# Patient Record
Sex: FEMALE | Race: WHITE | NOT HISPANIC OR LATINO
[De-identification: names, ages, dates, MRNs, and addresses within clinical notes are randomized per-mention and may not be internally consistent; named-entity substitution may affect disease eponyms.]

---

## 2018-02-14 ENCOUNTER — APPOINTMENT (OUTPATIENT)
Dept: MAMMOGRAPHY | Facility: HOSPITAL | Age: 68
End: 2018-02-14

## 2018-02-14 ENCOUNTER — OUTPATIENT (OUTPATIENT)
Dept: OUTPATIENT SERVICES | Facility: HOSPITAL | Age: 68
LOS: 1 days | End: 2018-02-14
Payer: MEDICARE

## 2018-02-14 PROCEDURE — 77067 SCR MAMMO BI INCL CAD: CPT

## 2018-02-14 PROCEDURE — 77067 SCR MAMMO BI INCL CAD: CPT | Mod: 26

## 2020-11-05 ENCOUNTER — OUTPATIENT (OUTPATIENT)
Dept: OUTPATIENT SERVICES | Facility: HOSPITAL | Age: 70
LOS: 1 days | End: 2020-11-05
Payer: MEDICARE

## 2020-11-05 ENCOUNTER — APPOINTMENT (OUTPATIENT)
Dept: MAMMOGRAPHY | Facility: HOSPITAL | Age: 70
End: 2020-11-05
Payer: MEDICARE

## 2020-11-05 PROCEDURE — 77067 SCR MAMMO BI INCL CAD: CPT | Mod: 26

## 2020-11-05 PROCEDURE — 77063 BREAST TOMOSYNTHESIS BI: CPT

## 2020-11-05 PROCEDURE — 77063 BREAST TOMOSYNTHESIS BI: CPT | Mod: 26

## 2020-11-05 PROCEDURE — 77067 SCR MAMMO BI INCL CAD: CPT

## 2020-11-09 ENCOUNTER — APPOINTMENT (OUTPATIENT)
Dept: RADIOLOGY | Facility: HOSPITAL | Age: 70
End: 2020-11-09
Payer: MEDICARE

## 2020-11-09 ENCOUNTER — OUTPATIENT (OUTPATIENT)
Dept: OUTPATIENT SERVICES | Facility: HOSPITAL | Age: 70
LOS: 1 days | End: 2020-11-09
Payer: MEDICARE

## 2020-11-09 PROCEDURE — 77080 DXA BONE DENSITY AXIAL: CPT | Mod: 26

## 2020-11-09 PROCEDURE — 77080 DXA BONE DENSITY AXIAL: CPT

## 2021-11-10 ENCOUNTER — OUTPATIENT (OUTPATIENT)
Dept: OUTPATIENT SERVICES | Facility: HOSPITAL | Age: 71
LOS: 1 days | End: 2021-11-10
Payer: MEDICARE

## 2021-11-10 ENCOUNTER — APPOINTMENT (OUTPATIENT)
Dept: MAMMOGRAPHY | Facility: HOSPITAL | Age: 71
End: 2021-11-10
Payer: MEDICARE

## 2021-11-10 PROCEDURE — 77067 SCR MAMMO BI INCL CAD: CPT

## 2021-11-10 PROCEDURE — 77067 SCR MAMMO BI INCL CAD: CPT | Mod: 26

## 2021-11-10 PROCEDURE — 77063 BREAST TOMOSYNTHESIS BI: CPT | Mod: 26

## 2021-11-10 PROCEDURE — 77063 BREAST TOMOSYNTHESIS BI: CPT

## 2022-11-11 ENCOUNTER — APPOINTMENT (OUTPATIENT)
Dept: MAMMOGRAPHY | Facility: HOSPITAL | Age: 72
End: 2022-11-11

## 2022-11-11 ENCOUNTER — OUTPATIENT (OUTPATIENT)
Dept: OUTPATIENT SERVICES | Facility: HOSPITAL | Age: 72
LOS: 1 days | End: 2022-11-11
Payer: MEDICARE

## 2022-11-11 PROCEDURE — 77067 SCR MAMMO BI INCL CAD: CPT | Mod: 26

## 2022-11-11 PROCEDURE — 77063 BREAST TOMOSYNTHESIS BI: CPT | Mod: 26

## 2022-11-11 PROCEDURE — 77063 BREAST TOMOSYNTHESIS BI: CPT

## 2022-11-11 PROCEDURE — 77067 SCR MAMMO BI INCL CAD: CPT

## 2023-08-14 ENCOUNTER — OUTPATIENT (OUTPATIENT)
Dept: OUTPATIENT SERVICES | Facility: HOSPITAL | Age: 73
LOS: 1 days | End: 2023-08-14
Payer: MEDICARE

## 2023-08-14 ENCOUNTER — APPOINTMENT (OUTPATIENT)
Dept: RADIOLOGY | Facility: HOSPITAL | Age: 73
End: 2023-08-14
Payer: MEDICARE

## 2023-08-14 PROCEDURE — 77080 DXA BONE DENSITY AXIAL: CPT | Mod: 26

## 2023-08-14 PROCEDURE — 77080 DXA BONE DENSITY AXIAL: CPT

## 2023-09-02 ENCOUNTER — EMERGENCY (EMERGENCY)
Facility: HOSPITAL | Age: 73
LOS: 1 days | Discharge: HOME | End: 2023-09-02
Attending: EMERGENCY MEDICINE | Admitting: EMERGENCY MEDICINE
Payer: MEDICARE

## 2023-09-02 VITALS
SYSTOLIC BLOOD PRESSURE: 146 MMHG | HEART RATE: 86 BPM | DIASTOLIC BLOOD PRESSURE: 69 MMHG | RESPIRATION RATE: 18 BRPM | OXYGEN SATURATION: 96 % | TEMPERATURE: 99 F | WEIGHT: 171.96 LBS

## 2023-09-02 PROCEDURE — 99285 EMERGENCY DEPT VISIT HI MDM: CPT

## 2023-09-02 PROCEDURE — 70450 CT HEAD/BRAIN W/O DYE: CPT | Mod: MA

## 2023-09-02 PROCEDURE — 70486 CT MAXILLOFACIAL W/O DYE: CPT | Mod: 26,MA

## 2023-09-02 PROCEDURE — 73130 X-RAY EXAM OF HAND: CPT | Mod: 26,RT

## 2023-09-02 PROCEDURE — 99284 EMERGENCY DEPT VISIT MOD MDM: CPT | Mod: 25

## 2023-09-02 PROCEDURE — 73130 X-RAY EXAM OF HAND: CPT

## 2023-09-02 PROCEDURE — 70486 CT MAXILLOFACIAL W/O DYE: CPT | Mod: MA

## 2023-09-02 PROCEDURE — 70450 CT HEAD/BRAIN W/O DYE: CPT | Mod: 26,MA

## 2023-09-02 RX ORDER — ACETAMINOPHEN 500 MG
650 TABLET ORAL ONCE
Refills: 0 | Status: COMPLETED | OUTPATIENT
Start: 2023-09-02 | End: 2023-09-02

## 2023-09-02 RX ORDER — BACITRACIN ZINC 500 UNIT/G
1 OINTMENT IN PACKET (EA) TOPICAL ONCE
Refills: 0 | Status: COMPLETED | OUTPATIENT
Start: 2023-09-02 | End: 2023-09-02

## 2023-09-02 RX ADMIN — Medication 650 MILLIGRAM(S): at 16:07

## 2023-09-02 RX ADMIN — Medication 1 APPLICATION(S): at 16:07
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
## 2023-09-02 NOTE — ED PROVIDER NOTE - ENMT, MLM
right frontal hematoma involving upper eyelid with abrasion to right forehead. No discreet laceration.
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
## 2023-09-02 NOTE — ED PROVIDER NOTE - PATIENT PORTAL LINK FT
You can access the FollowMyHealth Patient Portal offered by [facility] by registering at the following website: [email or web address]. By joining [employer]’s FollowMyHealth portal, you will also be able to view your health information using other applications (apps) compatible with our system.

## 2023-09-02 NOTE — ED ADULT NURSE REASSESSMENT NOTE - NS ED NURSE REASSESS COMMENT FT1
Patient a/oX 4, no neuro deficits, states mild pain to right side of face/head.  Vital signs stable.  CT scan and Xray done.  Abrasions to right forehead and bilateral hands cleansed and Bacitracin applied.  Tylenol 650mg PO adminsitered for pain.  REsults and disposition pending.

## 2023-09-02 NOTE — ED PROVIDER NOTE - CLINICAL SUMMARY MEDICAL DECISION MAKING FREE TEXT BOX
mechanical fall with right facial/hand injury. neuro intact. no spinal tenderness.   ct head/face, xray hand ordered. r/o intracranial injury, fracture.   tylenol for pain  tetanus already updated at urgent care today. mechanical fall with right facial/hand injury. neuro intact. no spinal tenderness.   ct head/face, xray hand ordered. r/o intracranial injury, fracture. imaging neg.  tylenol for pain  tetanus already updated at urgent care today. return precautions discussed

## 2023-09-02 NOTE — ED ADULT NURSE NOTE - INCIDENT LOCATION
[facility]  Obstetrics & Gynecology  Progress Note    Patient Name: Maribel Valderrama  MRN: [id number]  Admission Date: 2017  Primary Care Provider: Primary Doctor No  Principal Problem: <principal problem not specified>    Subjective:     HPI:  Maribel Valderrama is a 30 y.o. [id number] who is PPD#8 s/p  presents complaining of pain and hardening in her right breast. She notes a temperature of 100.3 at home. She denies nausea, vomiting, has had a bowel movement two days ago, and denies dysuria or difficulty urinating.  Patient also notes headaches since the last two days. The headache has been intermittent, throbbing, in the right frontal head. She denies any vision changes, SOB, chest pain, increased extremity edema, or RUQ pain. She has no history of hypertension.  This IUP was complicated by h/o marginal previa now resolved, h/o bilateral ovarian cystectomy, benign right ovarian teratoma, and h/o missed AB.    Interval History:   Patient doing well this AM. She reports feeling better this AM. She denies current headache, denies RUQ pain, denies visual changes, denies SOB.  She reports right breast continues to be tender to palpation and warm to touch. She is pumping for infant.     Scheduled Meds:   cephALEXin  500 mg Oral Q6H    prenatal vitamin  1 tablet Oral Daily     Continuous Infusions:   lactated Ringers 1,000 mL (17 2312)    magnesium sulfate in water 2 g/hr (17 0004)     PRN Meds:calcium gluconate, diphenhydrAMINE, diphenhydrAMINE, hydrocodone-acetaminophen 5-325mg, ibuprofen, ondansetron, promethazine (PHENERGAN) IVPB, senna-docusate 8.6-50 mg, simethicone    Review of patient's allergies indicates:  No Known Allergies    Objective:     Vital Signs (Most Recent):  Temp: 97.9 °F (36.6 °C) (17 0305)  Pulse: 99 (17 0515)  Resp: 18 (17 0420)  BP: 118/68 (17 0520)  SpO2: 96 % (17 0515) Vital Signs (24h Range):  Temp:  [97.3 °F  (36.3 °C)-100.6 °F (38.1 °C)] 97.9 °F (36.6 °C)  Pulse:  [] 99  Resp:  [18] 18  SpO2:  [96 %-99 %] 96 %  BP: (118-183)/(62-93) 118/68        There is no height or weight on file to calculate BMI.  Patient's last menstrual period was 12/12/2016 (exact date).    I&O (Last 24H):    No intake/output data recorded.  I/O this shift:  In: 1456.7 [P.O.:720; I.V.:736.7]  Out: 1400 [Urine:1400]      Laboratory:    Recent Labs  Lab 09/21/17  2100   WBC 6.48   HGB 11.7*   HCT 33.8*   MCV 97   PLT 52*        Recent Labs  Lab 09/21/17  2351      K 3.4*      CO2 21*   BUN 12   CREATININE 0.7      PROT 6.2   BILITOT 0.4   ALKPHOS 92   *   AST 40        P:C 0.36        Physical Exam:   Constitutional: She is oriented to person, place, and time. She appears well-developed and well-nourished.    HENT:   Head: Normocephalic and atraumatic.    Eyes: EOM are normal.    Neck: Normal range of motion.    Cardiovascular: Normal rate and regular rhythm.     Pulmonary/Chest: Effort normal and breath sounds normal. No respiratory distress. She has no wheezes. She has no rales. Right breast exhibits tenderness and swelling (warmth). Right breast exhibits no mass, no nipple discharge, no skin change (lactating) and no bleeding. Left breast exhibits no skin change, no tenderness, no bleeding and no swelling. Breasts are symmetrical.            Abdominal: Soft. There is no tenderness. There is no rebound and no guarding.                 Neurological: She is alert and oriented to person, place, and time. She has normal reflexes.   2+ DTRs     Psychiatric: She has a normal mood and affect. Her behavior is normal. Judgment and thought content normal.       Assessment/Plan:     Pre-eclampsia in postpartum period    - severe range elevated blood pressures in [facility]  - s/p labetalol 20 in [facility]  - BP WNL overnight, BP: (118-183)/(62-93) 122/76  - PreE labs   - Plts 45   - AST/ALT 40/173   - Cr 0.7   - P:C 0.36  - MgSO4  started 2315 9/21  - Continue for 24 hours  - No s/sxs of toxicity         Mastitis    - Pain and tenderness in left breast  - Tmax/last 9/21100.6 @ 1946   - Cont keflex 500gm q6h  - Continue monitoring  - Okay to continue pumping for baby        Thrombocytopenia    - platelets 45 (down from baseline >200)            Ofelia Bautista MD  Obstetrics & Gynecology  [facility]   home

## 2023-09-02 NOTE — ED PROVIDER NOTE - NSFOLLOWUPINSTRUCTIONS_ED_ALL_ED_FT
Please see your primary care provider for followup.  Call for appointment.  If you have any problems with followup, please call the ED Referral Coordinator at [phone].  Return to the ER if symptoms worsen or other concerns.    Abrasion  ImageAn abrasion is a cut or a scrape on the surface of your skin. An abrasion does not go through all the layers of your skin. It is important to take good care of your abrasion to prevent infection.    Follow these instructions at home:  Medicines     Take or apply over-the-counter and prescription medicines only as told by your doctor.  If you were prescribed an antibiotic medicine, apply it as told by your doctor. Do not stop using the antibiotic even if you start to feel better.  Wound care     Clean the wound 2–3 times a day or as often as told by your doctor. To do this:    Wash the wound with mild soap and water.  Rinse off the soap.  Pat a clean towel on the wound to dry it. Do not rub it.    Keep the bandage (dressing) clean and dry as told by your doctor.  Follow instructions from your doctor about how to take care of your wound. Make sure you:     Wash your hands with soap and water before you change your bandage. If you cannot use soap and water, use hand .  Change your bandage as told by your doctor.     Check your wound every day for signs of infection. Check for:    Redness, swelling, or pain.   Fluid or blood.   Warmth.   Pus or a bad smell.    If directed, put ice on the injured area. To do this:    Put ice in a plastic bag.   Place a towel between your skin and the bag.   Leave the ice on for 20 minutes, 2–3 times a day.    General instructions     Do not take baths, swim, or use a hot tub until your doctor says it is okay.  If there is swelling, raise (elevate) the injured area above the level of your heart while you are sitting or lying down.  Keep all follow-up visits as told by your doctor. This is important.  Contact a doctor if:  You were given a tetanus shot, and you have any of these where the needle went in:    Swelling.  Very bad pain.  Redness.  Bleeding.    You have a lot of pain, and medicine does not help.  You have any of these at the site of the wound:    More redness.  More swelling.  More pain.    Get help right away if:  You have a red streak going away from your wound.  You have a fever.  You have fluid, blood, or pus coming from your wound.  There is a bad smell coming from your wound or bandage.  Summary  An abrasion is a cut or a scrape on the surface of your skin.  Take good care of your abrasion so it does not get infected.  Clean the wound with mild soap and water, and change your bandage as told by your doctor.  Call your doctor if you have redness, swelling, or more pain in your wound.  Get help right away if you have a fever or if you have fluid, blood, pus, a bad smell, or a red streak coming from the wound.  This information is not intended to replace advice given to you by your health care provider. Make sure you discuss any questions you have with your health care provider.    Head Injury    WHAT YOU NEED TO KNOW:    A head injury is most often caused by a blow to the head. This may occur from a fall, bicycle injury, sports injury, being struck in the head, or a motor vehicle accident.     DISCHARGE INSTRUCTIONS:    Call 911 or have someone else call for any of the following:     You cannot be woken.      You have a seizure.      You stop responding to others or you faint.      You have blurry or double vision.      Your speech becomes slurred or confused.      You have arm or leg weakness, loss of feeling, or new problems with coordination.      Your pupils are larger than usual or one pupil is a different size than the other.       You have blood or clear fluid coming out of your ears or nose.    Return to the emergency department if:     You have repeated or forceful vomiting.      You feel confused.      Your headache gets worse or becomes severe.      You or someone caring for you notices that you are harder to wake than usual.    Contact your healthcare provider if:     Your symptoms last longer than 6 weeks after the injury.      You have questions or concerns about your condition or care.    Medicines:     Acetaminophen decreases pain. Acetaminophen is available without a doctor's order. Ask how much to take and how often to take it. Follow directions. Acetaminophen can cause liver damage if not taken correctly.      Take your medicine as directed. Contact your healthcare provider if you think your medicine is not helping or if you have side effects. Tell him or her if you are allergic to any medicine. Keep a list of the medicines, vitamins, and herbs you take. Include the amounts, and when and why you take them. Bring the list or the pill bottles to follow-up visits. Carry your medicine list with you in case of an emergency.    Self-care:     Rest or do quiet activities for 24 to 48 hours. Limit your time watching TV, using the computer, or doing tasks that require a lot of thinking. Slowly return to your normal activities as directed. Do not play sports or do activities that may cause you to get hit in the head. Ask your healthcare provider when you can return to sports.       Apply ice on your head for 15 to 20 minutes every hour or as directed. Use an ice pack, or put crushed ice in a plastic bag. Cover it with a towel before you apply it to your skin. Ice helps prevent tissue damage and decreases swelling and pain.       Have someone stay with you for 24 hours or as directed. This person can monitor you for complications and call 911. When you are awake the person should ask you a few questions to see if you are thinking clearly. An example would be to ask your name or your address.     Prevent another head injury:     Wear a helmet that fits properly. Do this when you play sports, or ride a bike, scooter, or skateboard. Helmets help decrease your risk of a serious head injury. Talk to your healthcare provider about other ways you can protect yourself if you play sports.      Wear your seat belt every time you are in a car. This helps to decrease your risk for a head injury if you are in a car accident.     Follow up with your healthcare provider as directed: Write down your questions so you remember to ask them during your visits.
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
## 2023-09-05 DIAGNOSIS — Z88.6 ALLERGY STATUS TO ANALGESIC AGENT: ICD-10-CM
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
## 2023-09-05 DIAGNOSIS — S00.83XA CONTUSION OF OTHER PART OF HEAD, INITIAL ENCOUNTER: ICD-10-CM

## 2023-09-05 DIAGNOSIS — S60.416A ABRASION OF RIGHT LITTLE FINGER, INITIAL ENCOUNTER: ICD-10-CM
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
## 2023-09-05 DIAGNOSIS — S09.93XA UNSPECIFIED INJURY OF FACE, INITIAL ENCOUNTER: ICD-10-CM

## 2023-09-05 DIAGNOSIS — S60.221A CONTUSION OF RIGHT HAND, INITIAL ENCOUNTER: ICD-10-CM

## 2023-09-05 DIAGNOSIS — S00.81XA ABRASION OF OTHER PART OF HEAD, INITIAL ENCOUNTER: ICD-10-CM

## 2024-01-25 PROBLEM — Z78.9 OTHER SPECIFIED HEALTH STATUS: Chronic | Status: ACTIVE | Noted: 2023-09-02

## 2024-01-26 ENCOUNTER — APPOINTMENT (OUTPATIENT)
Dept: MAMMOGRAPHY | Facility: HOSPITAL | Age: 74
End: 2024-01-26
Payer: MEDICARE

## 2024-01-26 ENCOUNTER — APPOINTMENT (OUTPATIENT)
Dept: ULTRASOUND IMAGING | Facility: HOSPITAL | Age: 74
End: 2024-01-26
Payer: MEDICARE

## 2024-01-26 ENCOUNTER — OUTPATIENT (OUTPATIENT)
Dept: OUTPATIENT SERVICES | Facility: HOSPITAL | Age: 74
LOS: 1 days | End: 2024-01-26
Payer: MEDICARE

## 2024-01-26 PROCEDURE — 77063 BREAST TOMOSYNTHESIS BI: CPT | Mod: 26

## 2024-01-26 PROCEDURE — 76641 ULTRASOUND BREAST COMPLETE: CPT | Mod: 26,50,GZ

## 2024-01-26 PROCEDURE — 77067 SCR MAMMO BI INCL CAD: CPT | Mod: 26

## 2024-01-26 PROCEDURE — 77067 SCR MAMMO BI INCL CAD: CPT

## 2024-01-26 PROCEDURE — 77063 BREAST TOMOSYNTHESIS BI: CPT

## 2024-01-26 PROCEDURE — 76641 ULTRASOUND BREAST COMPLETE: CPT

## 2025-06-03 ENCOUNTER — APPOINTMENT (OUTPATIENT)
Dept: MAMMOGRAPHY | Facility: CLINIC | Age: 75
End: 2025-06-03
Payer: MEDICARE

## 2025-06-03 PROCEDURE — 77063 BREAST TOMOSYNTHESIS BI: CPT

## 2025-06-03 PROCEDURE — 77067 SCR MAMMO BI INCL CAD: CPT
